# Patient Record
Sex: MALE | Race: BLACK OR AFRICAN AMERICAN | URBAN - METROPOLITAN AREA
[De-identification: names, ages, dates, MRNs, and addresses within clinical notes are randomized per-mention and may not be internally consistent; named-entity substitution may affect disease eponyms.]

---

## 2021-11-02 ENCOUNTER — OFFICE VISIT (OUTPATIENT)
Dept: PAIN MANAGEMENT | Age: 66
End: 2021-11-02
Payer: COMMERCIAL

## 2021-11-02 VITALS
WEIGHT: 205 LBS | SYSTOLIC BLOOD PRESSURE: 138 MMHG | HEIGHT: 72 IN | HEART RATE: 74 BPM | BODY MASS INDEX: 27.77 KG/M2 | DIASTOLIC BLOOD PRESSURE: 73 MMHG

## 2021-11-02 DIAGNOSIS — E11.42 DIABETIC POLYNEUROPATHY ASSOCIATED WITH TYPE 2 DIABETES MELLITUS (HCC): ICD-10-CM

## 2021-11-02 DIAGNOSIS — M96.1 FAILED BACK SURGICAL SYNDROME: ICD-10-CM

## 2021-11-02 DIAGNOSIS — M54.16 LUMBAR RADICULOPATHY: ICD-10-CM

## 2021-11-02 DIAGNOSIS — G89.4 CHRONIC PAIN SYNDROME: ICD-10-CM

## 2021-11-02 DIAGNOSIS — M96.1 FAILED NECK SYNDROME: ICD-10-CM

## 2021-11-02 PROCEDURE — 99204 OFFICE O/P NEW MOD 45 MIN: CPT | Performed by: INTERNAL MEDICINE

## 2021-11-02 RX ORDER — METFORMIN HYDROCHLORIDE 500 MG/1
500 TABLET, EXTENDED RELEASE ORAL 2 TIMES DAILY
COMMUNITY

## 2021-11-02 RX ORDER — CHLORTHALIDONE 25 MG/1
25 TABLET ORAL DAILY
COMMUNITY

## 2021-11-02 RX ORDER — NIFEDIPINE 60 MG/1
60 TABLET, FILM COATED, EXTENDED RELEASE ORAL 2 TIMES DAILY
COMMUNITY

## 2021-11-02 RX ORDER — ESCITALOPRAM OXALATE 10 MG/1
10 TABLET ORAL DAILY
COMMUNITY

## 2021-11-02 RX ORDER — LISINOPRIL 40 MG/1
40 TABLET ORAL DAILY
COMMUNITY

## 2021-11-02 RX ORDER — ASPIRIN 81 MG/1
81 TABLET ORAL DAILY
COMMUNITY

## 2021-11-02 RX ORDER — CHLORAL HYDRATE 500 MG
3000 CAPSULE ORAL DAILY
COMMUNITY

## 2021-11-02 RX ORDER — FENOFIBRATE 134 MG/1
134 CAPSULE ORAL
COMMUNITY

## 2021-11-02 RX ORDER — GABAPENTIN 300 MG/1
300 CAPSULE ORAL EVERY EVENING
COMMUNITY

## 2021-11-02 RX ORDER — VITAMIN B COMPLEX
1000 TABLET ORAL DAILY
COMMUNITY

## 2021-11-02 RX ORDER — ATORVASTATIN CALCIUM 80 MG/1
80 TABLET, FILM COATED ORAL DAILY
COMMUNITY

## 2021-11-02 RX ORDER — METOPROLOL SUCCINATE 100 MG/1
100 TABLET, EXTENDED RELEASE ORAL DAILY
COMMUNITY

## 2021-11-02 RX ORDER — GABAPENTIN 100 MG/1
100 CAPSULE ORAL 2 TIMES DAILY
COMMUNITY

## 2021-11-02 RX ORDER — OXYCODONE AND ACETAMINOPHEN 7.5; 325 MG/1; MG/1
1 TABLET ORAL EVERY 4 HOURS PRN
COMMUNITY

## 2021-11-22 NOTE — PROGRESS NOTES
Mr. Pichardo is a 61 y.o. male seen consultation at the request of Dr. Divya Duarte for pain management. Patient states that he has been having pain in the back and neck and also in the right leg states symptoms of been going on for 10 to 12 years states he fell off a roof broke his back has had 3 back surgeries in 1 neck surgery back hurts more than the neck complains of pain going into the right leg and also going to the right arm. States he has been seeing  but states he was discharged because of cocaine in his urine status post CVA during the neck surgery right-sided states back hurts more than the legs has had epidural straight injection about 15 of them did not help much is done physical therapy. Describes the pain is stabbing aching pain no burning complain some pins and needle. Sleep is been poor has been using CPAP denies any headaches does complain of morning stiffness complains of fatigue. States he retired about 15 years ago not working since the accident single lives with his girlfriend does give a history of diabetes symptoms started suddenly has a prior episodes which have been treated medications and different modalities home exercises. Most the pain is in neck and lower back present on both sides. Activities such as standing walking lifting bending twisting vacuuming and doing yard work going up or down stairs putting shoes socks on all cause some to have increased pains help with medications grades of pain 10/10 has been taking Percocet for day 7.5 mg pills also on gabapentin 500 mg a day and takes Lexapro 10 mg of depression gives a history of diabetes stroke and high blood pressure. Patient smokes about a half a pack a day denies any alcohol abuse denies any marijuana use and as mentioned before history of cocaine in the urine screen with the last pain physician.  Does complain of numbness and tingling in the leg and foot and weakness of the lower extremity no instability baseline gait breath sounds, no rales, no wheezing   Cardiovascular:  Normal S1,S2, Normal rate, normal rhythm, no murmurs, no gallops, no rubs   GI:  Soft, nondistended, normal bowel sounds, nontender, no organomegaly, no mass, no rebound, no guarding  :  No costovertebral angle tenderness   Musculoskeletal:  No clubbing, no edema, no tenderness, no deformities. There are no varicosities  Lymphatic:  No lymphadenopathy noted   Neurologic:  Alert & oriented x 3, CN 2-12 normal, normal motor function, normal sensory function, there is decreased strength in the right lower extremity and right upper extremity  Psychiatric:  Speech and behavior appropriate, judgement and thought content normal.    Patient's old records reviewed in detail records from primary care physician reviewed imaging studies reviewed x-rays of the lumbar spine show postoperative changes fusion L4-S1 degenerative changes L2-3 L3-4. MRI of the lumbar spine was also reviewed which shows postop changes fusion of 4 to S1 disc space narrowing L2-3 and L3-4 with foraminal stenosis and impingement of the possible L2 nerve root. IMPRESSION    CHRONIC PAIN SYNDROME  FAILED BACK SURGERY SYNDROME  DIABETIC PERIPHERAL NEUROPATHY  LUMBAR RADICULOPATHY  FAILED NECK SURGERY SYNDROME  SUBSTANCE ABUSE    Chronic opiate treatment protocol was discussed with the patient. Informed verbal consent was obtained. Treatment guidelines were established. Risks and benefits of the medications including narcotics were discussed with the patient. SOAPP questionnaire and opioid risk tool were assessed. Long-term and short-term goals of pain management were also addressed including pain relief about 30% from baseline, improving mood, sleep, psychosocial, and physical functioning were addressed.   Patient is scoring high on the ORT about 5 will not start any opioids at this time does have a history of cocaine use in 2021 patient is refusing all adjuvants all treatment options including injections have been discussed with patient will give referral for x-ray bilateral hips which she is refusing blood tests are reviewed which are within normal limits will order urine drug screen which she which was not done as patient did not want to do it as he was not going to be getting treated here Patient profile on [facility] website was reviewed all treatment options were discussed with patient. Goals of current treatment regimen include improvement in pain, restoration of functioning- with focus on improvement in physical performance, general activity, work or disability,emotional distress, health care utilization and  decreased medication consumption. Will continue to monitor progress towards achieving/maintaining therapeutic goals with special emphasis on  1. Improvement in perceived interfernce  of pain with ADL's. Ability to do home exercises independently. Ability to do household chores indoor and/or outdoor work and social and leisure activities. To increase flexibility/ROM, strength and endurance. Improve psychosocial and physical functioning. 2. Improving sleep to 6-7 hours a night. Improve mood/ anxiety and depression symptoms such as crying spells, low energy, problems with concentration, motivation. 3. Reduction of reliance on opioid analgesia/more appropriate opioid use. Risks and benefits of the medications and other alternative treatments have been/were  discussed with the patient. Any questions on the  common side effects of these medications were also answered. Informed verbal consent was obtained. The current treatment regimen is needed to decrease the patient's pain  symptoms, improve the quality of life and ability to function and improve the  sleep and mood symptoms.    Patient was advised against drinking alcohol with the narcotic pain medicines, advised against driving or handling machinery when  starting or adjusting the dose of medicines, feeling groggy or drowsy, or if having any

## 2022-03-07 ENCOUNTER — TELEPHONE (OUTPATIENT)
Dept: PAIN MANAGEMENT | Age: 67
End: 2022-03-07

## 2022-03-07 NOTE — TELEPHONE ENCOUNTER
Per patient  would like to know what the name of doctor [address] [place] [address] referred him to ????       Please advise

## 2022-03-08 NOTE — TELEPHONE ENCOUNTER
Patient called again. Amina Schultz he would like a list of other providers mailed to his home address.        Mailed on 03/08/22

## 2024-08-09 ENCOUNTER — TELEPHONE (OUTPATIENT)
Dept: PAIN MANAGEMENT | Age: 69
End: 2024-08-09

## 2024-08-09 NOTE — TELEPHONE ENCOUNTER
Pt wants to know if he is still able to be seen or does he has to get another referral. Please advise the pt.

## 2024-08-09 NOTE — TELEPHONE ENCOUNTER
Per [facility], he would like to know why he never came back to our office, please advise patient why he did not return back to us.